# Patient Record
Sex: FEMALE | Race: WHITE
[De-identification: names, ages, dates, MRNs, and addresses within clinical notes are randomized per-mention and may not be internally consistent; named-entity substitution may affect disease eponyms.]

---

## 2019-06-10 ENCOUNTER — FORM ENCOUNTER (OUTPATIENT)
Age: 54
End: 2019-06-10

## 2019-09-22 ENCOUNTER — FORM ENCOUNTER (OUTPATIENT)
Age: 54
End: 2019-09-22

## 2020-10-26 ENCOUNTER — FORM ENCOUNTER (OUTPATIENT)
Age: 55
End: 2020-10-26

## 2021-02-14 ENCOUNTER — FORM ENCOUNTER (OUTPATIENT)
Age: 56
End: 2021-02-14

## 2021-08-30 ENCOUNTER — APPOINTMENT (OUTPATIENT)
Dept: OBGYN | Facility: CLINIC | Age: 56
End: 2021-08-30
Payer: COMMERCIAL

## 2021-08-30 VITALS
BODY MASS INDEX: 38.09 KG/M2 | HEIGHT: 63 IN | HEART RATE: 108 BPM | DIASTOLIC BLOOD PRESSURE: 111 MMHG | SYSTOLIC BLOOD PRESSURE: 159 MMHG | WEIGHT: 215 LBS

## 2021-08-30 PROBLEM — Z00.00 ENCOUNTER FOR PREVENTIVE HEALTH EXAMINATION: Status: ACTIVE | Noted: 2021-08-30

## 2021-08-30 PROCEDURE — 99213 OFFICE O/P EST LOW 20 MIN: CPT

## 2021-08-30 NOTE — HISTORY OF PRESENT ILLNESS
[FreeTextEntry1] : fu ascus\par reduced libido\par \par previous romero visit\par \par Patient\par Name	SRIKANTH MYERS (54yo, F) ID# [id number]	Appt. Date/Time	02/15/2021 04:20PM\par 	1965	Service Dept.	[facility] BK OFF[facility]\par Provider	MACI BECK MD\par Insurance	\par Med Primary: [facility] [place] [employer] (PPO)\par Insurance # : [id number]\par Policy/Group # : [id number]\par Prescription: [facility] - Member is eligible. details\par Chief Complaint\par Well Woman Visit\par Patient's Care Team\par Primary Care Provider: MALATHI GRIGSBY MD: [address] [place] [address], Ph [phone], Fax [phone] NPI: [id number]\par Patient's Pharmacies\par [id number] ([place]): [address] [place] [address], Ph [phone], Fax [phone]\par Vitals\par 02/15/2021 04:46 pm\par Ht:	5 ft 3 in\par Wt:	214 lbs\par BMI:	37.9\par BP:	124/78 sitting\par Allergies\par Reviewed Allergies\par BACITRACIN	\par Medications\par Reviewed Medications\par Afluria Quad [id number] (PF) 60 mcg (15 mcg x 4)/0.5 mL IM syringe\par 18   filled	[facility]\par amLODIPine 10 mg tablet\par TAKE 1 TABLET BY MOUTH EVERY DAY\par 21   filled	[facility]\par azithromycin 250 mg tablet\par 16   filled	[facility]\par Daily-Charlee tablet\par TK 1 T PO QD\par 20   filled	[facility]\par fluconazole 200 mg tablet\par Take 1 tablet(s) every day by oral route as directed for 3 days.\par 16   filled	[facility]\par losartan 50 mg-hydrochlorothiazide 12.5 mg tablet\par TAKE 1 TABLET BY MOUTH EVERY DAY\par 21   filled	[facility]\par pantoprazole 40 mg tablet,delayed release\par TAKE 1 TABLET BY MOUTH EVERY DAY\par 21   filled	[facility]\par SF 5000 Plus 1.1 % dental cream\par 19   filled	[facility]\par Problems\par Reviewed Problems\par Atypical squamous cells of undetermined significance on cervical Papanicolaou smear - Onset: 2018\par Postcoital bleeding\par Urinary incontinence\par Cystocele\par Vulvovaginitis - Onset: 2014\par Irregular periods - skipping mths\par Gynecologic examination\par Family History\par Reviewed Family History\par Non-contributory.\par Mother	- Hypertensive disorder\par Father	- Parkinson's disease\par  	- Malignant melanoma\par Social History\par Reviewed Social History\par Tobacco Smoking Status: Never smoker\par Most Recent Tobacco Use Screenin/15/2021\par Surgical History\par Reviewed Surgical History\par Cholecystectomy\par Breast Biopsy - x3\par Caesarean Section\par GYN History\par Reviewed GYN History\par LMP: Definite.\par Date of LMP: 2014.\par Obstetric History\par Reviewed Obstetric History\par TOTAL	FULL	PRE	AB. I	AB. S	ECTOPICS	MULTIPLE	LIVING\par 3	3						3\par 2 's and 1 cs\par \par Past Medical History\par Reviewed Past Medical History\par High Blood Pressure: Y\par Screening\par None recorded.\par HPI\par all ok\par \par ROS\par Patient reports decreased libido. She reports no fatigue, no fever, no significant weight gain, and no significant weight loss. She reports no abnormal moles and no rashes. She reports no irritation and no vision changes. She reports no hearing loss, no ear pain, no nose/sinus problems, no sore throat, no snoring, no dry mouth, and no mouth ulcers. She reports no dyspnea / shortness of breath, no cough, no sputum production, no hemoptysis, and no wheezing. She reports no chest pain, no palpitations, and no orthopnea. She reports no heartburn, no dysphagia, no nausea, no vomiting, no abdominal pain, no bowel movement changes, no diarrhea, no constipation, and no rectal bleeding. She reports no hematuria, no abnormal bleeding, no flank pain, no trouble urinating, no incontinence, no rash, no lesion, no discharge, no vaginal odor, and no vaginal itching. She reports no menstrual problems and no PMDD symptoms. She reports no menopausal symptoms. She reports no muscle aches, no muscle weakness, no arthralgias/joint pain, and no back pain. She reports no headaches, no dizziness, no LOC, no weakness, no numbness, and no seizures. She reports no depression, no alcoholism, and no sleep disturbances.\par Physical Exam\par Patient is a 55-year-old female.\par \par Chaperone: Chaperone: present.\par \par Female Genitalia: Vulva: no masses, atrophy, or lesions. Bladder/Urethra: no urethral discharge or mass and normal meatus and bladder non distended. Vagina no tenderness, erythema, cystocele, rectocele, abnormal vaginal discharge, or vesicle(s) or ulcers. Cervix: no discharge or cervical motion tenderness and grossly normal. Uterus: normal size and shape and midline, mobile, non-tender, and no uterine prolapse. Adnexa/Parametria: no parametrial tenderness or mass and no adnexal tenderness or ovarian mass.\par \par Breast: Inspection/Palpation: no erythema, induration, tenderness, skin changes, abnormal secretions, or distinct masses and normal nipple appearance and non tender axillary lymph nodes.\par \par Abdomen: Auscultation/Inspection/Palpation: no tenderness, hepatomegaly, splenomegaly, masses, or CVA tenderness and soft, non-distended, and normal bowel sounds. Hernia: none palpated.\par Assessment / Plan\par 1. Gynecologic examination\par Z01.419: Encounter for gynecological examination (general) (routine) without abnormal findings\par PAP, LB\par MAMMO, SCREENING, BILATERAL\par \par 2. Atypical squamous cells of undetermined significance on cervical Papanicolaou smear -\par hpv was negative\par \par pap repeated again\par \par R87.610: Atypical squamous cells of undetermined significance on cytologic smear of cervix (ASC-US)\par \par 3. Reduced libido -\par normal\par \par R68.82: Decreased libido\par \par \par Return to Office\par None recorded.\par Encounter Sign-Off\par Encounter signed-off by Maci Beck MD, 02/15/2021.\par Encounter performed and documented by Maci Beck MD\par Encounter reviewed & signed by Maci Beck MD on 02/15/2021 at 4:56pm

## 2021-09-04 LAB — HPV HIGH+LOW RISK DNA PNL CVX: NOT DETECTED

## 2021-09-11 LAB — CYTOLOGY CVX/VAG DOC THIN PREP: NORMAL

## 2021-11-03 ENCOUNTER — TRANSCRIPTION ENCOUNTER (OUTPATIENT)
Age: 56
End: 2021-11-03

## 2022-02-28 ENCOUNTER — NON-APPOINTMENT (OUTPATIENT)
Age: 57
End: 2022-02-28

## 2022-02-28 ENCOUNTER — APPOINTMENT (OUTPATIENT)
Dept: OBGYN | Facility: CLINIC | Age: 57
End: 2022-02-28
Payer: COMMERCIAL

## 2022-02-28 VITALS
BODY MASS INDEX: 38.09 KG/M2 | HEART RATE: 89 BPM | WEIGHT: 215 LBS | TEMPERATURE: 98.1 F | DIASTOLIC BLOOD PRESSURE: 89 MMHG | HEIGHT: 63 IN | SYSTOLIC BLOOD PRESSURE: 152 MMHG

## 2022-02-28 DIAGNOSIS — R68.82 DECREASED LIBIDO: ICD-10-CM

## 2022-02-28 PROCEDURE — 99396 PREV VISIT EST AGE 40-64: CPT

## 2022-03-05 LAB — HPV HIGH+LOW RISK DNA PNL CVX: NOT DETECTED

## 2022-03-12 LAB — CYTOLOGY CVX/VAG DOC THIN PREP: ABNORMAL

## 2022-03-18 ENCOUNTER — NON-APPOINTMENT (OUTPATIENT)
Age: 57
End: 2022-03-18

## 2022-08-22 ENCOUNTER — APPOINTMENT (OUTPATIENT)
Dept: OBGYN | Facility: CLINIC | Age: 57
End: 2022-08-22

## 2022-08-22 VITALS
BODY MASS INDEX: 38.98 KG/M2 | HEART RATE: 97 BPM | DIASTOLIC BLOOD PRESSURE: 94 MMHG | HEIGHT: 63 IN | SYSTOLIC BLOOD PRESSURE: 153 MMHG | WEIGHT: 220 LBS

## 2022-08-22 DIAGNOSIS — N89.8 OTHER SPECIFIED NONINFLAMMATORY DISORDERS OF VAGINA: ICD-10-CM

## 2022-08-22 PROCEDURE — 99213 OFFICE O/P EST LOW 20 MIN: CPT

## 2022-08-22 NOTE — PHYSICAL EXAM
[Labia Majora] : normal [Labia Minora] : normal [Discharge] : a  ~M vaginal discharge was present [White] : white [Normal] : normal [Uterine Adnexae] : normal

## 2022-08-25 LAB
A VAGINAE DNA VAG QL NAA+PROBE: NORMAL
BVAB2 DNA VAG QL NAA+PROBE: NORMAL
C KRUSEI DNA VAG QL NAA+PROBE: NEGATIVE
C TRACH RRNA SPEC QL NAA+PROBE: NEGATIVE
HPV HIGH+LOW RISK DNA PNL CVX: NOT DETECTED
MEGA1 DNA VAG QL NAA+PROBE: NORMAL
N GONORRHOEA RRNA SPEC QL NAA+PROBE: NEGATIVE
T VAGINALIS RRNA SPEC QL NAA+PROBE: NEGATIVE

## 2022-09-06 LAB — CYTOLOGY CVX/VAG DOC THIN PREP: NORMAL

## 2023-03-06 ENCOUNTER — APPOINTMENT (OUTPATIENT)
Dept: OBGYN | Facility: CLINIC | Age: 58
End: 2023-03-06
Payer: COMMERCIAL

## 2023-03-06 VITALS
HEIGHT: 63 IN | BODY MASS INDEX: 39.87 KG/M2 | SYSTOLIC BLOOD PRESSURE: 145 MMHG | WEIGHT: 225 LBS | HEART RATE: 95 BPM | DIASTOLIC BLOOD PRESSURE: 86 MMHG

## 2023-03-06 DIAGNOSIS — N76.3 SUBACUTE AND CHRONIC VULVITIS: ICD-10-CM

## 2023-03-06 PROCEDURE — 99396 PREV VISIT EST AGE 40-64: CPT

## 2023-03-06 RX ORDER — CLOTRIMAZOLE AND BETAMETHASONE DIPROPIONATE 10; .5 MG/G; MG/G
1-0.05 CREAM TOPICAL TWICE DAILY
Qty: 1 | Refills: 3 | Status: ACTIVE | COMMUNITY
Start: 2023-03-06 | End: 1900-01-01

## 2023-03-11 LAB
CYTOLOGY CVX/VAG DOC THIN PREP: ABNORMAL
HPV HIGH+LOW RISK DNA PNL CVX: NOT DETECTED

## 2023-03-13 ENCOUNTER — NON-APPOINTMENT (OUTPATIENT)
Age: 58
End: 2023-03-13

## 2023-09-11 ENCOUNTER — APPOINTMENT (OUTPATIENT)
Dept: OBGYN | Facility: CLINIC | Age: 58
End: 2023-09-11
Payer: COMMERCIAL

## 2023-09-11 VITALS
DIASTOLIC BLOOD PRESSURE: 82 MMHG | SYSTOLIC BLOOD PRESSURE: 138 MMHG | HEIGHT: 63 IN | WEIGHT: 224 LBS | BODY MASS INDEX: 39.69 KG/M2

## 2023-09-11 DIAGNOSIS — Z78.9 OTHER SPECIFIED HEALTH STATUS: ICD-10-CM

## 2023-09-11 DIAGNOSIS — R87.610 ATYPICAL SQUAMOUS CELLS OF UNDETERMINED SIGNIFICANCE ON CYTOLOGIC SMEAR OF CERVIX (ASC-US): ICD-10-CM

## 2023-09-11 PROCEDURE — 99213 OFFICE O/P EST LOW 20 MIN: CPT

## 2023-09-16 LAB — HPV HIGH+LOW RISK DNA PNL CVX: NOT DETECTED

## 2023-09-23 LAB — CYTOLOGY CVX/VAG DOC THIN PREP: NORMAL

## 2024-03-11 ENCOUNTER — APPOINTMENT (OUTPATIENT)
Dept: OBGYN | Facility: CLINIC | Age: 59
End: 2024-03-11
Payer: COMMERCIAL

## 2024-03-11 VITALS
WEIGHT: 220 LBS | DIASTOLIC BLOOD PRESSURE: 91 MMHG | HEART RATE: 95 BPM | SYSTOLIC BLOOD PRESSURE: 138 MMHG | BODY MASS INDEX: 38.98 KG/M2 | HEIGHT: 63 IN

## 2024-03-11 DIAGNOSIS — Z01.419 ENCOUNTER FOR GYNECOLOGICAL EXAMINATION (GENERAL) (ROUTINE) W/OUT ABNORMAL FINDINGS: ICD-10-CM

## 2024-03-11 DIAGNOSIS — N81.4 UTEROVAGINAL PROLAPSE, UNSPECIFIED: ICD-10-CM

## 2024-03-11 DIAGNOSIS — N81.10 CYSTOCELE, UNSPECIFIED: ICD-10-CM

## 2024-03-11 LAB
BILIRUB UR QL STRIP: NORMAL
CLARITY UR: CLEAR
COLLECTION METHOD: NORMAL
GLUCOSE UR-MCNC: NORMAL
HCG UR QL: 0.2 EU/DL
HGB UR QL STRIP.AUTO: ABNORMAL
KETONES UR-MCNC: NORMAL
LEUKOCYTE ESTERASE UR QL STRIP: NORMAL
NITRITE UR QL STRIP: NORMAL
PH UR STRIP: 5.5
PROT UR STRIP-MCNC: NORMAL
SP GR UR STRIP: 1.02

## 2024-03-11 PROCEDURE — 99459 PELVIC EXAMINATION: CPT

## 2024-03-11 PROCEDURE — 81003 URINALYSIS AUTO W/O SCOPE: CPT | Mod: QW

## 2024-03-11 PROCEDURE — 99396 PREV VISIT EST AGE 40-64: CPT

## 2024-03-11 RX ORDER — LOSARTAN POTASSIUM AND HYDROCHLOROTHIAZIDE 12.5; 5 MG/1; MG/1
50-12.5 TABLET ORAL
Refills: 0 | Status: ACTIVE | COMMUNITY

## 2024-03-11 RX ORDER — AMLODIPINE BESYLATE 10 MG/1
10 TABLET ORAL
Refills: 0 | Status: ACTIVE | COMMUNITY

## 2024-03-11 NOTE — HISTORY OF PRESENT ILLNESS
[FreeTextEntry1] : HERE FOR HER ANNUAL FEELS WELL NO MORE PELVIC PRESSURE  HX OF ASCUS, LAST PAP NORMAL

## 2024-03-11 NOTE — PHYSICAL EXAM
[Chaperone Present] : A chaperone was present in the examining room during all aspects of the physical examination [Examination Of The Breasts] : a normal appearance [Normal] : normal [No Masses] : no breast masses were palpable [Cystocele] : a cystocele [Uterine Prolapse] : uterine prolapse [FreeTextEntry1] : bulge noted at posterior perineum

## 2024-03-15 LAB — HPV HIGH+LOW RISK DNA PNL CVX: NOT DETECTED

## 2024-03-22 LAB — CYTOLOGY CVX/VAG DOC THIN PREP: ABNORMAL

## 2025-03-12 DIAGNOSIS — Z12.39 ENCOUNTER FOR OTHER SCREENING FOR MALIGNANT NEOPLASM OF BREAST: ICD-10-CM

## 2025-03-17 ENCOUNTER — NON-APPOINTMENT (OUTPATIENT)
Age: 60
End: 2025-03-17

## 2025-03-17 ENCOUNTER — APPOINTMENT (OUTPATIENT)
Dept: OBGYN | Facility: CLINIC | Age: 60
End: 2025-03-17
Payer: COMMERCIAL

## 2025-03-17 VITALS
DIASTOLIC BLOOD PRESSURE: 88 MMHG | WEIGHT: 230 LBS | SYSTOLIC BLOOD PRESSURE: 131 MMHG | HEIGHT: 63 IN | HEART RATE: 97 BPM | BODY MASS INDEX: 40.75 KG/M2

## 2025-03-17 DIAGNOSIS — Z01.419 ENCOUNTER FOR GYNECOLOGICAL EXAMINATION (GENERAL) (ROUTINE) W/OUT ABNORMAL FINDINGS: ICD-10-CM

## 2025-03-17 DIAGNOSIS — N81.10 CYSTOCELE, UNSPECIFIED: ICD-10-CM

## 2025-03-17 DIAGNOSIS — R39.89 OTHER SYMPTOMS AND SIGNS INVOLVING THE GENITOURINARY SYSTEM: ICD-10-CM

## 2025-03-17 DIAGNOSIS — N81.4 UTEROVAGINAL PROLAPSE, UNSPECIFIED: ICD-10-CM

## 2025-03-17 LAB
BILIRUB UR QL STRIP: NORMAL
CLARITY UR: CLEAR
COLLECTION METHOD: NORMAL
GLUCOSE UR-MCNC: NORMAL
HCG UR QL: 0.2 EU/DL
HGB UR QL STRIP.AUTO: NORMAL
KETONES UR-MCNC: NORMAL
LEUKOCYTE ESTERASE UR QL STRIP: ABNORMAL
NITRITE UR QL STRIP: NORMAL
PH UR STRIP: 5
PROT UR STRIP-MCNC: NORMAL
SP GR UR STRIP: 1.02

## 2025-03-17 PROCEDURE — 99386 PREV VISIT NEW AGE 40-64: CPT

## 2025-03-17 PROCEDURE — 81003 URINALYSIS AUTO W/O SCOPE: CPT | Mod: QW

## 2025-03-17 PROCEDURE — 99459 PELVIC EXAMINATION: CPT

## 2025-03-17 PROCEDURE — 99396 PREV VISIT EST AGE 40-64: CPT

## 2025-03-22 DIAGNOSIS — N39.0 URINARY TRACT INFECTION, SITE NOT SPECIFIED: ICD-10-CM

## 2025-03-22 LAB
BACTERIA UR CULT: ABNORMAL
HPV HIGH+LOW RISK DNA PNL CVX: NOT DETECTED

## 2025-03-22 RX ORDER — AMOXICILLIN AND CLAVULANATE POTASSIUM 875; 125 MG/1; MG/1
875-125 TABLET, COATED ORAL
Qty: 14 | Refills: 0 | Status: ACTIVE | COMMUNITY
Start: 2025-03-22 | End: 1900-01-01

## 2025-03-25 ENCOUNTER — NON-APPOINTMENT (OUTPATIENT)
Age: 60
End: 2025-03-25

## 2025-03-26 LAB — CYTOLOGY CVX/VAG DOC THIN PREP: NORMAL

## 2025-03-27 ENCOUNTER — NON-APPOINTMENT (OUTPATIENT)
Age: 60
End: 2025-03-27

## 2025-03-27 DIAGNOSIS — B37.9 CANDIDIASIS, UNSPECIFIED: ICD-10-CM

## 2025-03-27 DIAGNOSIS — T36.95XA CANDIDIASIS, UNSPECIFIED: ICD-10-CM

## 2025-03-27 RX ORDER — FLUCONAZOLE 200 MG/1
200 TABLET ORAL
Qty: 3 | Refills: 0 | Status: ACTIVE | COMMUNITY
Start: 2025-03-27 | End: 1900-01-01

## 2025-04-29 ENCOUNTER — NON-APPOINTMENT (OUTPATIENT)
Age: 60
End: 2025-04-29

## 2025-04-29 RX ORDER — FLUCONAZOLE 200 MG/1
200 TABLET ORAL
Qty: 3 | Refills: 2 | Status: ACTIVE | COMMUNITY
Start: 2025-04-29 | End: 1900-01-01